# Patient Record
Sex: MALE | Race: OTHER | HISPANIC OR LATINO | URBAN - METROPOLITAN AREA
[De-identification: names, ages, dates, MRNs, and addresses within clinical notes are randomized per-mention and may not be internally consistent; named-entity substitution may affect disease eponyms.]

---

## 2019-02-21 ENCOUNTER — OTHER- (OUTPATIENT)
Dept: URBAN - METROPOLITAN AREA CLINIC 8 | Facility: CLINIC | Age: 49
Setting detail: DERMATOLOGY
End: 2019-02-21

## 2019-02-21 DIAGNOSIS — L82.1 OTHER SEBORRHEIC KERATOSIS: ICD-10-CM

## 2019-02-21 PROCEDURE — 11105 PUNCH BX SKIN EA SEP/ADDL: CPT

## 2019-02-21 PROCEDURE — 11104 PUNCH BX SKIN SINGLE LESION: CPT

## 2019-02-21 RX ORDER — TRIAMCINOLONE ACETONIDE 1 MG/G
1 APPLICATION CREAM TOPICAL BID
Qty: 453.6 | Refills: 0 | Status: DISCONTINUED
Start: 2019-02-21 | End: 2019-03-01

## 2019-03-01 ENCOUNTER — FOLLOW-UP (OUTPATIENT)
Dept: URBAN - NONMETROPOLITAN AREA CLINIC 5 | Facility: CLINIC | Age: 49
Setting detail: DERMATOLOGY
End: 2019-03-01

## 2019-03-01 DIAGNOSIS — C44.729 SQUAMOUS CELL CARCINOMA OF SKIN OF LEFT LOWER LIMB, INCLUDING HIP: ICD-10-CM

## 2019-03-01 PROCEDURE — 99213 OFFICE O/P EST LOW 20 MIN: CPT

## 2019-03-01 RX ORDER — METHOTREXATE 2.5 MG/1
4 TABLET TABLET ORAL Q1WK
Qty: 16 | Refills: 0 | Status: DISCONTINUED
Start: 2019-03-01 | End: 2019-05-24

## 2019-03-01 RX ORDER — METHOTREXATE 2.5 MG/1
4 TABLET TABLET ORAL Q1WK
Qty: 16 | Refills: 0 | Status: DISCONTINUED
Start: 2019-03-01 | End: 2019-03-01

## 2019-03-01 RX ORDER — TRIAMCINOLONE ACETONIDE 1 MG/G
1 APPLICATION CREAM TOPICAL BID
Qty: 453.6 | Refills: 0 | Status: DISCONTINUED
Start: 2019-03-01 | End: 2019-03-01

## 2019-03-01 RX ORDER — TRIAMCINOLONE ACETONIDE 1 MG/G
1 APPLICATION CREAM TOPICAL BID
Qty: 453.6 | Refills: 0 | Status: DISCONTINUED
Start: 2019-03-01 | End: 2019-05-24

## 2019-03-01 RX ORDER — FOLIC ACID 1 MG/1
1 TABLET TABLET ORAL DAILY
Qty: 30 | Refills: 0 | Status: DISCONTINUED
Start: 2019-03-01 | End: 2019-05-24

## 2019-03-03 PROBLEM — Z79.899 OTHER LONG TERM (CURRENT) DRUG THERAPY: Status: ACTIVE | Noted: 2019-03-03

## 2019-03-03 PROBLEM — Z79.899 OTHER LONG-TERM (CURRENT) DRUG THERAPY: Status: RESOLVED | Noted: 2019-03-03

## 2019-03-18 ENCOUNTER — RX ONLY (RX ONLY)
Age: 49
End: 2019-03-18

## 2019-03-18 RX ORDER — TRIAMCINOLONE ACETONIDE 1 MG/G
1 APPLICATION CREAM TOPICAL BID
Qty: 453.6 | Refills: 2 | Status: DISCONTINUED
Start: 2019-03-18 | End: 2019-05-24

## 2019-03-29 ENCOUNTER — FOLLOW-UP (OUTPATIENT)
Dept: URBAN - NONMETROPOLITAN AREA CLINIC 5 | Facility: CLINIC | Age: 49
Setting detail: DERMATOLOGY
End: 2019-03-29

## 2019-03-29 DIAGNOSIS — D48.5 NEOPLASM OF UNCERTAIN BEHAVIOR OF SKIN: ICD-10-CM

## 2019-03-29 PROCEDURE — 99213 OFFICE O/P EST LOW 20 MIN: CPT

## 2019-03-31 PROBLEM — Z79.899 OTHER LONG TERM (CURRENT) DRUG THERAPY: Status: ACTIVE | Noted: 2019-03-31

## 2019-03-31 PROBLEM — Z79.899 OTHER LONG-TERM (CURRENT) DRUG THERAPY: Status: RESOLVED | Noted: 2019-03-31

## 2019-04-01 ENCOUNTER — RX ONLY (RX ONLY)
Age: 49
End: 2019-04-01

## 2019-04-01 RX ORDER — FOLIC ACID 1 MG/1
1 TABLET TABLET ORAL DAILY
Qty: 60 | Refills: 0 | Status: DISCONTINUED
Start: 2019-04-01 | End: 2019-05-24

## 2019-04-01 RX ORDER — METHOTREXATE 2.5 MG/1
4 TABLET TABLET ORAL Q1WK
Qty: 32 | Refills: 0 | Status: DISCONTINUED
Start: 2019-04-01 | End: 2019-05-24

## 2019-05-24 ENCOUNTER — FOLLOW-UP (OUTPATIENT)
Dept: URBAN - NONMETROPOLITAN AREA CLINIC 5 | Facility: CLINIC | Age: 49
Setting detail: DERMATOLOGY
End: 2019-05-24

## 2019-05-24 DIAGNOSIS — L28.1 PRURIGO NODULARIS: ICD-10-CM

## 2019-05-24 PROCEDURE — 99213 OFFICE O/P EST LOW 20 MIN: CPT

## 2019-05-24 RX ORDER — FOLIC ACID 1 MG/1
1 TABLET TABLET ORAL AS DIRECTED
Qty: 30 | Refills: 0 | Status: DISCONTINUED
Start: 2019-05-24 | End: 2019-10-25

## 2019-05-24 RX ORDER — TRIAMCINOLONE ACETONIDE 1 MG/G
1 APPLICATION CREAM TOPICAL BID
Qty: 454 | Refills: 0 | Status: DISCONTINUED
Start: 2019-05-24 | End: 2019-06-21

## 2019-05-24 RX ORDER — METHOTREXATE 2.5 MG/1
6 TABLET TABLET ORAL Q1WK
Qty: 30 | Refills: 0 | Status: DISCONTINUED
Start: 2019-05-24 | End: 2019-10-25

## 2019-05-26 PROBLEM — Z79.899 OTHER LONG TERM (CURRENT) DRUG THERAPY: Status: ACTIVE | Noted: 2019-05-26

## 2019-05-26 PROBLEM — Z79.899 OTHER LONG-TERM (CURRENT) DRUG THERAPY: Status: RESOLVED | Noted: 2019-05-26

## 2019-06-21 ENCOUNTER — FOLLOW-UP (OUTPATIENT)
Dept: URBAN - NONMETROPOLITAN AREA CLINIC 5 | Facility: CLINIC | Age: 49
Setting detail: DERMATOLOGY
End: 2019-06-21

## 2019-06-21 DIAGNOSIS — L57.0 ACTINIC KERATOSIS: ICD-10-CM

## 2019-06-21 PROBLEM — Z79.899 OTHER LONG TERM (CURRENT) DRUG THERAPY: Status: ACTIVE | Noted: 2019-06-21

## 2019-06-21 PROBLEM — Z79.899 OTHER LONG-TERM (CURRENT) DRUG THERAPY: Status: RESOLVED | Noted: 2019-06-21

## 2019-06-21 PROCEDURE — 99212 OFFICE O/P EST SF 10 MIN: CPT

## 2019-06-21 RX ORDER — FOLIC ACID 1 MG/1
1 TABLET TABLET ORAL AS DIRECTED
Qty: 30 | Refills: 0 | Status: DISCONTINUED
Start: 2019-06-21 | End: 2019-10-25

## 2019-06-21 RX ORDER — TRIAMCINOLONE ACETONIDE 1 MG/G
1 APPLICATION CREAM TOPICAL BID
Qty: 454 | Refills: 0 | Status: DISCONTINUED
Start: 2019-06-21 | End: 2019-10-25

## 2019-06-21 RX ORDER — METHOTREXATE 2.5 MG/1
6 TABLET TABLET ORAL Q1WK
Qty: 24 | Refills: 0 | Status: DISCONTINUED
Start: 2019-06-21 | End: 2019-10-25

## 2019-07-19 ENCOUNTER — FOLLOW-UP (OUTPATIENT)
Dept: URBAN - NONMETROPOLITAN AREA CLINIC 5 | Facility: CLINIC | Age: 49
Setting detail: DERMATOLOGY
End: 2019-07-19

## 2019-07-19 DIAGNOSIS — L57.0 ACTINIC KERATOSIS: ICD-10-CM

## 2019-07-19 PROCEDURE — 99212 OFFICE O/P EST SF 10 MIN: CPT

## 2019-07-19 RX ORDER — FOLIC ACID 1 MG/1
1 TABLET TABLET ORAL DAILY
Qty: 90 | Refills: 0 | Status: DISCONTINUED
Start: 2019-07-19 | End: 2019-10-25

## 2019-07-19 RX ORDER — TRIAMCINOLONE ACETONIDE 1 MG/G
1 APPLICATION CREAM TOPICAL AS DIRECTED
Qty: 454 | Refills: 2 | Status: DISCONTINUED
Start: 2019-07-19 | End: 2019-10-25

## 2019-07-19 RX ORDER — METHOTREXATE 2.5 MG/1
4 TABLET TABLET ORAL AS DIRECTED
Qty: 54 | Refills: 0 | Status: DISCONTINUED
Start: 2019-07-19 | End: 2019-10-25

## 2019-10-25 ENCOUNTER — RX ONLY (RX ONLY)
Age: 49
End: 2019-10-25

## 2019-10-25 ENCOUNTER — FOLLOW-UP (OUTPATIENT)
Dept: URBAN - NONMETROPOLITAN AREA CLINIC 5 | Facility: CLINIC | Age: 49
Setting detail: DERMATOLOGY
End: 2019-10-25

## 2019-10-25 DIAGNOSIS — L57.0 ACTINIC KERATOSIS: ICD-10-CM

## 2019-10-25 PROCEDURE — 99213 OFFICE O/P EST LOW 20 MIN: CPT

## 2019-10-25 RX ORDER — METHOTREXATE 2.5 MG/1
1 TABLET TABLET ORAL Q1WK
Qty: 52 | Refills: 0 | Status: DISCONTINUED
Start: 2019-10-25 | End: 2020-01-31

## 2019-10-25 RX ORDER — FOLIC ACID 1 MG/1
1 TABLET TABLET ORAL DAILY
Qty: 90 | Refills: 3 | Status: DISCONTINUED
Start: 2019-10-25 | End: 2020-01-31

## 2019-10-25 RX ORDER — HALOBETASOL PROPIONATE AND TAZAROTENE .1; .45 MG/G; MG/G
1 APPLICATION LOTION TOPICAL DAILY
Qty: 100 | Refills: 2 | Status: DISCONTINUED
Start: 2019-10-25 | End: 2020-01-31

## 2019-10-25 RX ORDER — TRIAMCINOLONE ACETONIDE 1 MG/G
1 APPLICATION CREAM TOPICAL AS DIRECTED
Qty: 454 | Refills: 2 | Status: DISCONTINUED
Start: 2019-10-25 | End: 2020-01-31

## 2020-01-31 ENCOUNTER — FOLLOW-UP (OUTPATIENT)
Dept: URBAN - NONMETROPOLITAN AREA CLINIC 5 | Facility: CLINIC | Age: 50
Setting detail: DERMATOLOGY
End: 2020-01-31

## 2020-01-31 DIAGNOSIS — L57.0 ACTINIC KERATOSIS: ICD-10-CM

## 2020-01-31 PROCEDURE — 99213 OFFICE O/P EST LOW 20 MIN: CPT

## 2020-01-31 RX ORDER — FOLIC ACID 1 MG/1
1 TABLET TABLET ORAL DAILY
Qty: 30 | Refills: 2
Start: 2020-01-31

## 2020-01-31 RX ORDER — TRIAMCINOLONE ACETONIDE 1 MG/G
1 APPLICATION CREAM TOPICAL AS DIRECTED
Qty: 454 | Refills: 2 | Status: DISCONTINUED
Start: 2020-01-31 | End: 2020-04-24

## 2020-01-31 RX ORDER — METHOTREXATE 2.5 MG/1
5 TABLET TABLET ORAL Q1WK
Qty: 20 | Refills: 2
Start: 2020-01-31

## 2020-02-01 PROBLEM — Z79.899 OTHER LONG-TERM (CURRENT) DRUG THERAPY: Status: RESOLVED | Noted: 2020-02-01

## 2020-02-01 PROBLEM — Z79.899 OTHER LONG TERM (CURRENT) DRUG THERAPY: Status: ACTIVE | Noted: 2020-02-01

## 2020-04-24 ENCOUNTER — FOLLOW-UP (OUTPATIENT)
Dept: URBAN - NONMETROPOLITAN AREA CLINIC 5 | Facility: CLINIC | Age: 50
Setting detail: DERMATOLOGY
End: 2020-04-24

## 2020-04-24 DIAGNOSIS — D04.5 CARCINOMA IN SITU OF SKIN OF TRUNK: ICD-10-CM

## 2020-04-24 PROCEDURE — 99213 OFFICE O/P EST LOW 20 MIN: CPT

## 2020-04-24 RX ORDER — TRIAMCINOLONE ACETONIDE 1 MG/G
1 APPLICATION CREAM TOPICAL AS DIRECTED
Qty: 454 | Refills: 2
Start: 2020-04-24

## 2020-06-05 ENCOUNTER — OTHER- (OUTPATIENT)
Dept: URBAN - NONMETROPOLITAN AREA CLINIC 5 | Facility: CLINIC | Age: 50
Setting detail: DERMATOLOGY
End: 2020-06-05

## 2020-06-05 DIAGNOSIS — L57.0 ACTINIC KERATOSIS: ICD-10-CM

## 2020-06-05 PROCEDURE — 99213 OFFICE O/P EST LOW 20 MIN: CPT

## 2020-10-23 ENCOUNTER — FOLLOW-UP (OUTPATIENT)
Dept: URBAN - NONMETROPOLITAN AREA CLINIC 5 | Facility: CLINIC | Age: 50
Setting detail: DERMATOLOGY
End: 2020-10-23

## 2020-10-23 DIAGNOSIS — L57.0 ACTINIC KERATOSIS: ICD-10-CM

## 2020-10-23 PROCEDURE — 99213 OFFICE O/P EST LOW 20 MIN: CPT

## 2020-10-23 RX ORDER — METHOTREXATE 2.5 MG/1
6 TABLET TABLET ORAL Q1WK
Qty: 24 | Refills: 1
Start: 2020-10-23

## 2020-10-23 RX ORDER — TRIAMCINOLONE ACETONIDE 1 MG/G
1 A SMALL AMOUNT CREAM TOPICAL TWICE A DAY
Qty: 453 | Refills: 0
Start: 2020-10-23

## 2020-10-23 RX ORDER — FOLIC ACID 1 MG/1
1 TABLET TABLET ORAL DAILY
Qty: 30 | Refills: 3
Start: 2020-10-23

## 2020-12-18 ENCOUNTER — FOLLOW-UP (OUTPATIENT)
Dept: URBAN - NONMETROPOLITAN AREA CLINIC 5 | Facility: CLINIC | Age: 50
Setting detail: DERMATOLOGY
End: 2020-12-18

## 2020-12-18 DIAGNOSIS — C44.329 SQUAMOUS CELL CARCINOMA OF SKIN OF OTHER PARTS OF FACE: ICD-10-CM

## 2020-12-18 PROCEDURE — 99213 OFFICE O/P EST LOW 20 MIN: CPT

## 2020-12-20 PROBLEM — Z79.899 OTHER LONG-TERM (CURRENT) DRUG THERAPY: Status: RESOLVED | Noted: 2020-12-20

## 2020-12-20 PROBLEM — Z79.899 OTHER LONG TERM (CURRENT) DRUG THERAPY: Status: ACTIVE | Noted: 2020-12-20

## 2021-03-12 ENCOUNTER — RX ONLY (RX ONLY)
Age: 51
End: 2021-03-12

## 2021-03-12 RX ORDER — TRIAMCINOLONE ACETONIDE 1 MG/G
A SMALL AMOUNT CREAM TOPICAL TWICE A DAY
Qty: 454 | Refills: 2
Start: 2021-03-12

## 2021-03-12 RX ORDER — METHOTREXATE 2.5 MG/1
6 TABLET TABLET ORAL
Qty: 72 | Refills: 0
Start: 2021-03-12

## 2021-03-12 RX ORDER — FOLIC ACID 1 MG/1
1 TABLET TABLET ORAL AS DIRECTED
Qty: 90 | Refills: 0
Start: 2021-03-12

## 2021-06-04 ENCOUNTER — FOLLOW-UP (OUTPATIENT)
Dept: URBAN - NONMETROPOLITAN AREA CLINIC 5 | Facility: CLINIC | Age: 51
Setting detail: DERMATOLOGY
End: 2021-06-04

## 2021-06-04 DIAGNOSIS — L82.0 INFLAMED SEBORRHEIC KERATOSIS: ICD-10-CM

## 2021-06-04 PROCEDURE — 99214 OFFICE O/P EST MOD 30 MIN: CPT

## 2021-06-04 RX ORDER — METHOTREXATE 2.5 MG/1
6 TABLET TABLET ORAL
Qty: 72 | Refills: 0
Start: 2021-06-04

## 2021-06-06 PROBLEM — Z79.899 OTHER LONG-TERM (CURRENT) DRUG THERAPY: Status: RESOLVED | Noted: 2021-06-06

## 2021-06-06 PROBLEM — Z79.899 OTHER LONG TERM (CURRENT) DRUG THERAPY: Status: ACTIVE | Noted: 2021-06-06

## 2021-08-27 ENCOUNTER — OTHER- (OUTPATIENT)
Dept: URBAN - NONMETROPOLITAN AREA CLINIC 5 | Facility: CLINIC | Age: 51
Setting detail: DERMATOLOGY
End: 2021-08-27

## 2021-08-27 ENCOUNTER — RX ONLY (RX ONLY)
Age: 51
End: 2021-08-27

## 2021-08-27 DIAGNOSIS — D48.5 NEOPLASM OF UNCERTAIN BEHAVIOR OF SKIN: ICD-10-CM

## 2021-08-27 PROBLEM — Z79.899 OTHER LONG-TERM (CURRENT) DRUG THERAPY: Status: RESOLVED | Noted: 2021-08-27

## 2021-08-27 PROBLEM — Z79.899 OTHER LONG TERM (CURRENT) DRUG THERAPY: Status: ACTIVE | Noted: 2021-08-27

## 2021-08-27 PROCEDURE — 99213 OFFICE O/P EST LOW 20 MIN: CPT

## 2021-08-27 RX ORDER — FOLIC ACID 1 MG/1
1 TABLET TABLET ORAL DAILY
Qty: 30 | Refills: 4
Start: 2021-08-27

## 2021-08-27 RX ORDER — METHOTREXATE 2.5 MG/1
6 TABLET TABLET ORAL
Qty: 24 | Refills: 2
Start: 2021-08-27

## 2021-09-01 ENCOUNTER — RX ONLY (RX ONLY)
Age: 51
End: 2021-09-01

## 2021-09-01 RX ORDER — TRIAMCINOLONE ACETONIDE 1 MG/G
CREAM TOPICAL
Qty: 454 | Refills: 0
Start: 2021-09-01

## 2021-11-19 ENCOUNTER — FOLLOW-UP (OUTPATIENT)
Dept: URBAN - NONMETROPOLITAN AREA CLINIC 5 | Facility: CLINIC | Age: 51
Setting detail: DERMATOLOGY
End: 2021-11-19

## 2021-11-19 ENCOUNTER — RX ONLY (RX ONLY)
Age: 51
End: 2021-11-19

## 2021-11-19 DIAGNOSIS — L81.4 OTHER MELANIN HYPERPIGMENTATION: ICD-10-CM

## 2021-11-19 DIAGNOSIS — D18.01 HEMANGIOMA OF SKIN AND SUBCUTANEOUS TISSUE: ICD-10-CM

## 2021-11-19 DIAGNOSIS — D22.5 MELANOCYTIC NEVI OF TRUNK: ICD-10-CM

## 2021-11-19 DIAGNOSIS — L90.5 SCAR CONDITIONS AND FIBROSIS OF SKIN: ICD-10-CM

## 2021-11-19 DIAGNOSIS — Z85.828 PERSONAL HISTORY OF OTHER MALIGNANT NEOPLASM OF SKIN: ICD-10-CM

## 2021-11-19 DIAGNOSIS — L82.1 OTHER SEBORRHEIC KERATOSIS: ICD-10-CM

## 2021-11-19 PROCEDURE — 99214 OFFICE O/P EST MOD 30 MIN: CPT

## 2021-11-19 RX ORDER — RISANKIZUMAB-RZAA 150 MG/ML
1 SYRINGE INJECTION SUBCUTANEOUS
Qty: 2 | Refills: 0
Start: 2021-11-19

## 2021-11-19 RX ORDER — RISANKIZUMAB-RZAA 150 MG/ML
INJECTION SUBCUTANEOUS
Qty: 1 | Refills: 4
Start: 2021-11-19

## 2021-11-21 PROBLEM — Z79.899 OTHER LONG-TERM (CURRENT) DRUG THERAPY: Status: RESOLVED | Noted: 2021-11-21

## 2021-11-21 PROBLEM — Z79.899 OTHER LONG TERM (CURRENT) DRUG THERAPY: Status: ACTIVE | Noted: 2021-11-21

## 2021-12-03 ENCOUNTER — FOLLOW-UP (OUTPATIENT)
Dept: URBAN - NONMETROPOLITAN AREA CLINIC 5 | Facility: CLINIC | Age: 51
Setting detail: DERMATOLOGY
End: 2021-12-03

## 2021-12-03 DIAGNOSIS — C44.311 BASAL CELL CARCINOMA OF SKIN OF NOSE: ICD-10-CM

## 2021-12-03 PROCEDURE — 99214 OFFICE O/P EST MOD 30 MIN: CPT

## 2021-12-05 PROBLEM — Z79.899 OTHER LONG-TERM (CURRENT) DRUG THERAPY: Status: RESOLVED | Noted: 2021-12-05

## 2021-12-05 PROBLEM — Z79.899 OTHER LONG TERM (CURRENT) DRUG THERAPY: Status: ACTIVE | Noted: 2021-12-05

## 2022-01-14 ENCOUNTER — FOLLOW-UP (OUTPATIENT)
Dept: URBAN - NONMETROPOLITAN AREA CLINIC 5 | Facility: CLINIC | Age: 52
Setting detail: DERMATOLOGY
End: 2022-01-14

## 2022-01-14 DIAGNOSIS — D48.5 NEOPLASM OF UNCERTAIN BEHAVIOR OF SKIN: ICD-10-CM

## 2022-01-14 PROCEDURE — 99213 OFFICE O/P EST LOW 20 MIN: CPT

## 2022-04-08 ENCOUNTER — FOLLOW-UP (OUTPATIENT)
Dept: URBAN - NONMETROPOLITAN AREA CLINIC 5 | Facility: CLINIC | Age: 52
Setting detail: DERMATOLOGY
End: 2022-04-08

## 2022-04-08 DIAGNOSIS — L57.0 ACTINIC KERATOSIS: ICD-10-CM

## 2022-04-08 PROCEDURE — 99213 OFFICE O/P EST LOW 20 MIN: CPT

## 2022-07-15 ENCOUNTER — FOLLOW-UP (OUTPATIENT)
Dept: URBAN - NONMETROPOLITAN AREA CLINIC 5 | Facility: CLINIC | Age: 52
Setting detail: DERMATOLOGY
End: 2022-07-15

## 2022-07-15 ENCOUNTER — RX ONLY (RX ONLY)
Age: 52
End: 2022-07-15

## 2022-07-15 DIAGNOSIS — C44.612 BASAL CELL CARCINOMA OF SKIN OF RIGHT UPPER LIMB, INCLUDING SHOULDER: ICD-10-CM

## 2022-07-15 PROCEDURE — 99214 OFFICE O/P EST MOD 30 MIN: CPT

## 2022-07-15 RX ORDER — TRIAMCINOLONE ACETONIDE 1 MG/G
CREAM TOPICAL
Qty: 454 | Refills: 0
Start: 2022-07-15

## 2022-09-23 ENCOUNTER — RX ONLY (RX ONLY)
Age: 52
End: 2022-09-23

## 2022-09-23 RX ORDER — RISANKIZUMAB-RZAA 150 MG/ML
INJECTION SUBCUTANEOUS
Qty: 1 | Refills: 3 | Status: ERX | COMMUNITY
Start: 2022-09-23

## 2022-10-07 ENCOUNTER — APPOINTMENT (OUTPATIENT)
Dept: URBAN - NONMETROPOLITAN AREA CLINIC 47 | Age: 52
Setting detail: DERMATOLOGY
End: 2022-10-08

## 2022-10-07 DIAGNOSIS — L40.0 PSORIASIS VULGARIS: ICD-10-CM

## 2022-10-07 PROCEDURE — OTHER COUNSELING: OTHER

## 2022-10-07 PROCEDURE — OTHER SKYRIZI INJECTION: OTHER

## 2022-10-07 PROCEDURE — OTHER MEDICATION COUNSELING: OTHER

## 2022-10-07 PROCEDURE — OTHER PRESCRIPTION MEDICATION MANAGEMENT: OTHER

## 2022-10-07 PROCEDURE — 99213 OFFICE O/P EST LOW 20 MIN: CPT

## 2022-10-07 ASSESSMENT — LOCATION SIMPLE DESCRIPTION DERM
LOCATION SIMPLE: LEFT UPPER ARM
LOCATION SIMPLE: RIGHT HAND
LOCATION SIMPLE: RIGHT FOREARM

## 2022-10-07 ASSESSMENT — LOCATION DETAILED DESCRIPTION DERM
LOCATION DETAILED: RIGHT DISTAL DORSAL FOREARM
LOCATION DETAILED: RIGHT RADIAL DORSAL HAND
LOCATION DETAILED: LEFT PROXIMAL POSTERIOR UPPER ARM

## 2022-10-07 ASSESSMENT — PGA PSORIASIS: PGA PSORIASIS 2020: ALMOST CLEAR

## 2022-10-07 ASSESSMENT — LOCATION ZONE DERM
LOCATION ZONE: HAND
LOCATION ZONE: ARM
LOCATION ZONE: ARM

## 2022-10-07 ASSESSMENT — BSA PSORIASIS: % BODY COVERED IN PSORIASIS: 1

## 2022-10-07 NOTE — PROCEDURE: PRESCRIPTION MEDICATION MANAGEMENT
Detail Level: Zone
Render In Strict Bullet Format?: No
Continue Regimen: 1) Skyrizi 150mg SQ every 12 weeks and 2) Triamcinolone 0.1% cream twice daily as needed for residual areas.

## 2023-01-13 ENCOUNTER — APPOINTMENT (OUTPATIENT)
Dept: URBAN - NONMETROPOLITAN AREA CLINIC 47 | Age: 53
Setting detail: DERMATOLOGY
End: 2023-01-16

## 2023-01-13 DIAGNOSIS — L40.0 PSORIASIS VULGARIS: ICD-10-CM

## 2023-01-13 PROCEDURE — OTHER SKYRIZI INJECTION: OTHER

## 2023-01-13 PROCEDURE — 96372 THER/PROPH/DIAG INJ SC/IM: CPT

## 2023-01-13 PROCEDURE — OTHER MIPS QUALITY: OTHER

## 2023-01-13 ASSESSMENT — LOCATION DETAILED DESCRIPTION DERM: LOCATION DETAILED: RIGHT ANTERIOR PROXIMAL UPPER ARM

## 2023-01-13 ASSESSMENT — LOCATION ZONE DERM: LOCATION ZONE: ARM

## 2023-01-13 ASSESSMENT — LOCATION SIMPLE DESCRIPTION DERM: LOCATION SIMPLE: RIGHT UPPER ARM

## 2023-04-21 ENCOUNTER — APPOINTMENT (OUTPATIENT)
Dept: URBAN - NONMETROPOLITAN AREA CLINIC 47 | Age: 53
Setting detail: DERMATOLOGY
End: 2023-04-26

## 2023-04-21 DIAGNOSIS — L40.0 PSORIASIS VULGARIS: ICD-10-CM

## 2023-04-21 PROCEDURE — OTHER SKYRIZI INJECTION: OTHER

## 2023-04-21 PROCEDURE — 96372 THER/PROPH/DIAG INJ SC/IM: CPT

## 2023-04-21 ASSESSMENT — LOCATION SIMPLE DESCRIPTION DERM: LOCATION SIMPLE: RIGHT UPPER ARM

## 2023-04-21 ASSESSMENT — LOCATION DETAILED DESCRIPTION DERM: LOCATION DETAILED: RIGHT PROXIMAL LATERAL POSTERIOR UPPER ARM

## 2023-04-21 ASSESSMENT — LOCATION ZONE DERM: LOCATION ZONE: ARM

## 2023-07-14 ENCOUNTER — APPOINTMENT (OUTPATIENT)
Dept: URBAN - NONMETROPOLITAN AREA CLINIC 47 | Age: 53
Setting detail: DERMATOLOGY
End: 2023-07-17

## 2023-07-14 DIAGNOSIS — L40.0 PSORIASIS VULGARIS: ICD-10-CM

## 2023-07-14 PROCEDURE — OTHER SMOKING CESSATION COUNSELING: OTHER

## 2023-07-14 PROCEDURE — OTHER SKYRIZI INJECTION: OTHER

## 2023-07-14 PROCEDURE — 99214 OFFICE O/P EST MOD 30 MIN: CPT

## 2023-07-14 PROCEDURE — OTHER ADDITIONAL NOTES: OTHER

## 2023-07-14 PROCEDURE — OTHER ORDER TESTS: OTHER

## 2023-07-14 PROCEDURE — OTHER MIPS QUALITY: OTHER

## 2023-07-14 PROCEDURE — OTHER COUNSELING: OTHER

## 2023-07-14 ASSESSMENT — LOCATION SIMPLE DESCRIPTION DERM
LOCATION SIMPLE: RIGHT UPPER ARM
LOCATION SIMPLE: LEFT UPPER ARM

## 2023-07-14 ASSESSMENT — LOCATION DETAILED DESCRIPTION DERM
LOCATION DETAILED: LEFT ANTERIOR DISTAL UPPER ARM
LOCATION DETAILED: RIGHT ANTERIOR DISTAL UPPER ARM
LOCATION DETAILED: RIGHT PROXIMAL POSTERIOR UPPER ARM

## 2023-07-14 ASSESSMENT — PGA PSORIASIS: PGA PSORIASIS 2020: CLEAR

## 2023-07-14 ASSESSMENT — BSA PSORIASIS: % BODY COVERED IN PSORIASIS: 0

## 2023-07-14 ASSESSMENT — LOCATION ZONE DERM: LOCATION ZONE: ARM

## 2023-07-14 NOTE — PROCEDURE: ADDITIONAL NOTES
Render Risk Assessment In Note?: no
Detail Level: Simple
Additional Notes: Advised to get labs completed 1 week prior to next appt

## 2023-07-14 NOTE — PROCEDURE: SMOKING CESSATION COUNSELING
Counseling Text: Counseled patient to stop smoking and explained that smoking cessation would improve overall health and skin.
Detail Level: Simple
Time Spent Counseling (Min): 2

## 2023-10-06 ENCOUNTER — APPOINTMENT (OUTPATIENT)
Dept: URBAN - NONMETROPOLITAN AREA CLINIC 47 | Age: 53
Setting detail: DERMATOLOGY
End: 2023-10-07

## 2023-10-06 DIAGNOSIS — L40.0 PSORIASIS VULGARIS: ICD-10-CM

## 2023-10-06 PROCEDURE — OTHER ADDITIONAL NOTES: OTHER

## 2023-10-06 PROCEDURE — 99213 OFFICE O/P EST LOW 20 MIN: CPT

## 2023-10-06 PROCEDURE — OTHER SMOKING CESSATION COUNSELING: OTHER

## 2023-10-06 PROCEDURE — OTHER SKYRIZI INJECTION: OTHER

## 2023-10-06 PROCEDURE — OTHER COUNSELING: OTHER

## 2023-10-06 PROCEDURE — OTHER MIPS QUALITY: OTHER

## 2023-10-06 PROCEDURE — OTHER PRESCRIPTION MEDICATION MANAGEMENT: OTHER

## 2023-10-06 ASSESSMENT — LOCATION SIMPLE DESCRIPTION DERM
LOCATION SIMPLE: RIGHT UPPER ARM
LOCATION SIMPLE: LEFT UPPER ARM

## 2023-10-06 ASSESSMENT — LOCATION DETAILED DESCRIPTION DERM
LOCATION DETAILED: RIGHT PROXIMAL POSTERIOR UPPER ARM
LOCATION DETAILED: RIGHT ANTERIOR DISTAL UPPER ARM
LOCATION DETAILED: LEFT ANTERIOR DISTAL UPPER ARM

## 2023-10-06 ASSESSMENT — LOCATION ZONE DERM: LOCATION ZONE: ARM

## 2023-10-06 NOTE — PROCEDURE: PRESCRIPTION MEDICATION MANAGEMENT
Render In Strict Bullet Format?: No
Detail Level: Zone
Continue Regimen: Skyrizi injections q 12 weeks

## 2023-10-06 NOTE — PROCEDURE: SMOKING CESSATION COUNSELING
Counseling Text: Counseled patient to stop smoking and explained that smoking cessation would improve overall health and skin.
Detail Level: Simple
Time Spent Counseling (Min): 1

## 2024-01-12 ENCOUNTER — APPOINTMENT (OUTPATIENT)
Dept: URBAN - NONMETROPOLITAN AREA CLINIC 47 | Age: 54
Setting detail: DERMATOLOGY
End: 2024-01-15

## 2024-01-12 DIAGNOSIS — L40.0 PSORIASIS VULGARIS: ICD-10-CM

## 2024-01-12 PROCEDURE — OTHER COUNSELING: OTHER

## 2024-01-12 PROCEDURE — OTHER MIPS QUALITY: OTHER

## 2024-01-12 PROCEDURE — OTHER SMOKING CESSATION COUNSELING: OTHER

## 2024-01-12 PROCEDURE — OTHER PRESCRIPTION MEDICATION MANAGEMENT: OTHER

## 2024-01-12 PROCEDURE — 99213 OFFICE O/P EST LOW 20 MIN: CPT

## 2024-01-12 PROCEDURE — OTHER SKYRIZI INJECTION: OTHER

## 2024-01-12 ASSESSMENT — LOCATION SIMPLE DESCRIPTION DERM: LOCATION SIMPLE: RIGHT UPPER ARM

## 2024-01-12 ASSESSMENT — LOCATION DETAILED DESCRIPTION DERM
LOCATION DETAILED: RIGHT ANTERIOR DISTAL UPPER ARM
LOCATION DETAILED: RIGHT PROXIMAL POSTERIOR UPPER ARM

## 2024-01-12 ASSESSMENT — LOCATION ZONE DERM: LOCATION ZONE: ARM

## 2024-01-12 NOTE — PROCEDURE: SKYRIZI INJECTION
Skyrizi Amount: 150 mg
17634 Billing Preferences: By Number of Body Touches
Detail Level: None
Include J-Code In Bill: No
Ndc (75mg/0.83ml Syringe): [address]-93
Syringe Size Used (Required For Enhanced Ndc): 150 mg/ml Prefilled Syringe
Date Of Next Injection: 12 Weeks
Expiration Date (Optional): oct 2024
Lot # (Optional): [id number]
Use Enhanced Ndc?: Yes
Ndc (150mg/Ml Syringe): 9651-7502-02
J-Code: 
Consent: The risks of pain and injection site reactions were reviewed with the patient prior to the injection.
Administered By (Optional): VANDA Dumont

## 2024-01-12 NOTE — PROCEDURE: COUNSELING
Quality 410: Psoriasis Clinical Response To Oral Systemic Or Biologic Medications: Psoriasis Assessment Tool Documented, Met Specified Benchmark
Patient Specific Counseling (Will Not Stick From Patient To Patient): During today's visit we got on the phone with [employer] to authorize shipment of his Skyrizi to our office throughout the remainder of 2024  (pt provided consent for this during the call).
Detail Level: Zone

## 2024-04-19 ENCOUNTER — APPOINTMENT (OUTPATIENT)
Dept: URBAN - NONMETROPOLITAN AREA CLINIC 47 | Age: 54
Setting detail: DERMATOLOGY
End: 2024-04-20

## 2024-04-19 DIAGNOSIS — L40.0 PSORIASIS VULGARIS: ICD-10-CM

## 2024-04-19 PROCEDURE — OTHER SKYRIZI INJECTION: OTHER

## 2024-04-19 PROCEDURE — OTHER SMOKING CESSATION COUNSELING: OTHER

## 2024-04-19 PROCEDURE — OTHER PRESCRIPTION MEDICATION MANAGEMENT: OTHER

## 2024-04-19 PROCEDURE — OTHER COUNSELING: OTHER

## 2024-04-19 PROCEDURE — OTHER PRESCRIPTION: OTHER

## 2024-04-19 PROCEDURE — 99213 OFFICE O/P EST LOW 20 MIN: CPT

## 2024-04-19 PROCEDURE — OTHER MIPS QUALITY: OTHER

## 2024-04-19 RX ORDER — TRIAMCINOLONE ACETONIDE 1 MG/G
CREAM TOPICAL TWICE DAILY
Qty: 453.6 | Refills: 1 | Status: ERX

## 2024-04-19 RX ORDER — TRIAMCINOLONE ACETONIDE 1 MG/G
CREAM TOPICAL TWICE DAILY
Qty: 453.6 | Refills: 1 | Status: ERX | COMMUNITY
Start: 2024-04-19

## 2024-04-19 ASSESSMENT — LOCATION SIMPLE DESCRIPTION DERM: LOCATION SIMPLE: RIGHT UPPER ARM

## 2024-04-19 ASSESSMENT — LOCATION ZONE DERM: LOCATION ZONE: ARM

## 2024-04-19 ASSESSMENT — PGA PSORIASIS: PGA PSORIASIS 2020: ALMOST CLEAR

## 2024-04-19 ASSESSMENT — BSA PSORIASIS: % BODY COVERED IN PSORIASIS: 1

## 2024-04-19 NOTE — PROCEDURE: SKYRIZI INJECTION
Skyrizi Amount: 150 mg
22858 Billing Preferences: By Number of Body Touches
Detail Level: None
Include J-Code In Bill: No
Ndc (75mg/0.83ml Syringe): [address]-84
Syringe Size Used (Required For Enhanced Ndc): 150 mg/ml Prefilled Syringe
Date Of Next Injection: 12 Weeks
Expiration Date (Optional): 12/2024
Lot # (Optional): [id number]
Use Enhanced Ndc?: Yes
Ndc (150mg/Ml Syringe): 6405-4007-89
J-Code: 
Consent: The risks of pain and injection site reactions were reviewed with the patient prior to the injection.
Administered By (Optional): VANDA Dumont

## 2024-07-12 ENCOUNTER — APPOINTMENT (OUTPATIENT)
Dept: URBAN - NONMETROPOLITAN AREA CLINIC 47 | Age: 54
Setting detail: DERMATOLOGY
End: 2024-07-16

## 2024-07-12 DIAGNOSIS — L28.1 PRURIGO NODULARIS: ICD-10-CM

## 2024-07-12 DIAGNOSIS — L40.0 PSORIASIS VULGARIS: ICD-10-CM

## 2024-07-12 PROCEDURE — OTHER ORDER TESTS: OTHER

## 2024-07-12 PROCEDURE — 96372 THER/PROPH/DIAG INJ SC/IM: CPT

## 2024-07-12 PROCEDURE — OTHER MIPS QUALITY: OTHER

## 2024-07-12 PROCEDURE — OTHER SMOKING CESSATION COUNSELING: OTHER

## 2024-07-12 PROCEDURE — OTHER PRESCRIPTION MEDICATION MANAGEMENT: OTHER

## 2024-07-12 PROCEDURE — OTHER SKYRIZI INJECTION: OTHER

## 2024-07-12 PROCEDURE — 99213 OFFICE O/P EST LOW 20 MIN: CPT | Mod: 25

## 2024-07-12 PROCEDURE — OTHER COUNSELING: OTHER

## 2024-07-12 PROCEDURE — OTHER PRESCRIPTION: OTHER

## 2024-07-12 RX ORDER — CLOBETASOL PROPIONATE 0.5 MG/G
CREAM TOPICAL
Qty: 60 | Refills: 3 | Status: ERX | COMMUNITY
Start: 2024-07-12

## 2024-07-12 ASSESSMENT — LOCATION SIMPLE DESCRIPTION DERM: LOCATION SIMPLE: RIGHT UPPER ARM

## 2024-07-12 ASSESSMENT — BSA PSORIASIS: % BODY COVERED IN PSORIASIS: 1

## 2024-07-12 ASSESSMENT — PGA PSORIASIS: PGA PSORIASIS 2020: ALMOST CLEAR

## 2024-07-12 ASSESSMENT — LOCATION ZONE DERM: LOCATION ZONE: ARM

## 2024-07-12 NOTE — PROCEDURE: ORDER TESTS
Expected Date Of Service: 07/12/2024
Bill For Surgical Tray: no
Billing Type: Third-Party Bill
Performing Laboratory: [id number]

## 2024-07-12 NOTE — PROCEDURE: SKYRIZI INJECTION
Skyrizi Amount: 150 mg
58383 Billing Preferences: By Number of Body Touches
Detail Level: None
Include J-Code In Bill: No
Ndc (75mg/0.83ml Syringe): [address]-60
Syringe Size Used (Required For Enhanced Ndc): 150 mg/ml Prefilled Syringe
Date Of Next Injection: 12 Weeks
Expiration Date (Optional): APR2025
Lot # (Optional): [id number]
Use Enhanced Ndc?: Yes
Ndc (150mg/Ml Syringe): 6724-5048-99
J-Code: 
Consent: The risks of pain and injection site reactions were reviewed with the patient prior to the injection.
Administered By (Optional): Dunia poole

## 2024-07-12 NOTE — PROCEDURE: PRESCRIPTION MEDICATION MANAGEMENT
Render In Strict Bullet Format?: No
Detail Level: Zone
Continue Regimen: Skyrizi 150mg injections SQ every 12 weeks

## 2024-09-23 ENCOUNTER — RX ONLY (RX ONLY)
Age: 54
End: 2024-09-23

## 2024-09-23 RX ORDER — RISANKIZUMAB-RZAA 150 MG/ML
INJECTION SUBCUTANEOUS
Qty: 1 | Refills: 3 | Status: ERX

## 2024-10-04 ENCOUNTER — APPOINTMENT (OUTPATIENT)
Dept: URBAN - NONMETROPOLITAN AREA CLINIC 47 | Age: 54
Setting detail: DERMATOLOGY
End: 2024-10-05

## 2024-10-04 DIAGNOSIS — L40.0 PSORIASIS VULGARIS: ICD-10-CM

## 2024-10-04 PROCEDURE — OTHER COUNSELING: OTHER

## 2024-10-04 PROCEDURE — OTHER PRESCRIPTION MEDICATION MANAGEMENT: OTHER

## 2024-10-04 PROCEDURE — 99213 OFFICE O/P EST LOW 20 MIN: CPT

## 2024-10-04 PROCEDURE — OTHER PSORIATIC EPIDEMIOLOGY SCREENING TOOL (PEST): OTHER

## 2024-10-04 ASSESSMENT — BSA PSORIASIS: % BODY COVERED IN PSORIASIS: 0

## 2024-10-04 ASSESSMENT — ITCH NUMERIC RATING SCALE: HOW SEVERE IS YOUR ITCHING?: 0

## 2024-10-04 ASSESSMENT — LOCATION ZONE DERM: LOCATION ZONE: ARM

## 2024-10-04 ASSESSMENT — PGA PSORIASIS: PGA PSORIASIS 2020: CLEAR

## 2024-10-04 ASSESSMENT — LOCATION SIMPLE DESCRIPTION DERM: LOCATION SIMPLE: RIGHT UPPER ARM

## 2024-10-04 ASSESSMENT — LOCATION DETAILED DESCRIPTION DERM: LOCATION DETAILED: RIGHT ANTERIOR DISTAL UPPER ARM

## 2024-10-04 NOTE — PROCEDURE: PRESCRIPTION MEDICATION MANAGEMENT
Render In Strict Bullet Format?: No
Detail Level: Zone
Continue Regimen: Skyrizi 150mg injections SQ every 12 weeks.  Continue triamcinolone 0.1% cream BID PRN flares.

## 2025-01-24 ENCOUNTER — APPOINTMENT (OUTPATIENT)
Dept: URBAN - NONMETROPOLITAN AREA CLINIC 47 | Age: 55
Setting detail: DERMATOLOGY
End: 2025-01-26

## 2025-01-24 DIAGNOSIS — L40.0 PSORIASIS VULGARIS: ICD-10-CM

## 2025-01-24 PROCEDURE — OTHER PSORIATIC EPIDEMIOLOGY SCREENING TOOL (PEST): OTHER

## 2025-01-24 PROCEDURE — OTHER PRESCRIPTION MEDICATION MANAGEMENT: OTHER

## 2025-01-24 PROCEDURE — OTHER COUNSELING: OTHER

## 2025-01-24 PROCEDURE — OTHER SKYRIZI INJECTION: OTHER

## 2025-01-24 PROCEDURE — 99213 OFFICE O/P EST LOW 20 MIN: CPT

## 2025-01-24 PROCEDURE — OTHER PRESCRIPTION: OTHER

## 2025-01-24 RX ORDER — TRIAMCINOLONE ACETONIDE 1 MG/G
CREAM TOPICAL TWICE DAILY
Qty: 454 | Refills: 1 | Status: ERX

## 2025-01-24 ASSESSMENT — PGA PSORIASIS: PGA PSORIASIS 2020: CLEAR

## 2025-01-24 ASSESSMENT — LOCATION DETAILED DESCRIPTION DERM: LOCATION DETAILED: LEFT DISTAL POSTERIOR UPPER ARM

## 2025-01-24 ASSESSMENT — LOCATION SIMPLE DESCRIPTION DERM: LOCATION SIMPLE: LEFT UPPER ARM

## 2025-01-24 ASSESSMENT — ITCH NUMERIC RATING SCALE: HOW SEVERE IS YOUR ITCHING?: 0

## 2025-01-24 ASSESSMENT — LOCATION ZONE DERM: LOCATION ZONE: ARM

## 2025-01-24 ASSESSMENT — BSA PSORIASIS: % BODY COVERED IN PSORIASIS: 0

## 2025-01-24 NOTE — PROCEDURE: SKYRIZI INJECTION
Render If Medication Purchased By Clinic In Visit Note?: Yes
Lot # (Optional): [id number]
Administered By (Optional): Donna Jett
04162 Billing Preferences: By Number of Body Touches
J-Code: 
Include J-Code In Bill: No
Ndc (150mg/Ml Auto-Injector): 4730-6950-28
Date Of Next Injection: 12 Weeks
Syringe Size Used (Required For Enhanced Ndc): 150 mg/ml Prefilled Syringe
Skyrizi Amount: 150 mg
Expiration Date (Optional): JAN2026
Ndc (75mg/0.83ml Syringe): [address]-37
Consent: The risks of pain and injection site reactions were reviewed with the patient prior to the injection.
Detail Level: None

## 2025-05-16 ENCOUNTER — APPOINTMENT (OUTPATIENT)
Dept: URBAN - NONMETROPOLITAN AREA CLINIC 47 | Age: 55
Setting detail: DERMATOLOGY
End: 2025-05-19

## 2025-05-16 DIAGNOSIS — L40.0 PSORIASIS VULGARIS: ICD-10-CM

## 2025-05-16 PROCEDURE — 99213 OFFICE O/P EST LOW 20 MIN: CPT | Mod: 25

## 2025-05-16 PROCEDURE — OTHER SKYRIZI INJECTION: OTHER

## 2025-05-16 PROCEDURE — OTHER COUNSELING: OTHER

## 2025-05-16 PROCEDURE — OTHER PRESCRIPTION MEDICATION MANAGEMENT: OTHER

## 2025-05-16 PROCEDURE — 96372 THER/PROPH/DIAG INJ SC/IM: CPT

## 2025-05-16 PROCEDURE — OTHER ORDER TESTS: OTHER

## 2025-05-16 ASSESSMENT — BSA PSORIASIS: % BODY COVERED IN PSORIASIS: 0

## 2025-05-16 ASSESSMENT — LOCATION SIMPLE DESCRIPTION DERM: LOCATION SIMPLE: RIGHT UPPER ARM

## 2025-05-16 ASSESSMENT — LOCATION ZONE DERM: LOCATION ZONE: ARM

## 2025-05-16 ASSESSMENT — PGA PSORIASIS: PGA PSORIASIS 2020: CLEAR

## 2025-05-16 ASSESSMENT — ITCH NUMERIC RATING SCALE: HOW SEVERE IS YOUR ITCHING?: 0

## 2025-05-16 ASSESSMENT — LOCATION DETAILED DESCRIPTION DERM: LOCATION DETAILED: RIGHT DISTAL POSTERIOR UPPER ARM

## 2025-08-08 ENCOUNTER — APPOINTMENT (OUTPATIENT)
Dept: URBAN - NONMETROPOLITAN AREA CLINIC 47 | Age: 55
Setting detail: DERMATOLOGY
End: 2025-08-10

## 2025-08-08 DIAGNOSIS — L40.0 PSORIASIS VULGARIS: ICD-10-CM

## 2025-08-08 PROCEDURE — OTHER PRESCRIPTION MEDICATION MANAGEMENT: OTHER

## 2025-08-08 PROCEDURE — OTHER COUNSELING: OTHER

## 2025-08-08 PROCEDURE — OTHER SKYRIZI INJECTION: OTHER

## 2025-08-08 PROCEDURE — 99213 OFFICE O/P EST LOW 20 MIN: CPT

## 2025-08-08 PROCEDURE — OTHER ADDITIONAL NOTES: OTHER

## 2025-08-08 ASSESSMENT — BSA PSORIASIS: % BODY COVERED IN PSORIASIS: 0

## 2025-08-08 ASSESSMENT — PGA PSORIASIS: PGA PSORIASIS 2020: CLEAR

## 2025-08-08 ASSESSMENT — LOCATION DETAILED DESCRIPTION DERM: LOCATION DETAILED: RIGHT DISTAL POSTERIOR UPPER ARM

## 2025-08-08 ASSESSMENT — LOCATION ZONE DERM: LOCATION ZONE: ARM

## 2025-08-08 ASSESSMENT — ITCH NUMERIC RATING SCALE: HOW SEVERE IS YOUR ITCHING?: 0

## 2025-08-08 ASSESSMENT — LOCATION SIMPLE DESCRIPTION DERM: LOCATION SIMPLE: RIGHT UPPER ARM
